# Patient Record
Sex: MALE | Race: WHITE | Employment: UNEMPLOYED | URBAN - METROPOLITAN AREA
[De-identification: names, ages, dates, MRNs, and addresses within clinical notes are randomized per-mention and may not be internally consistent; named-entity substitution may affect disease eponyms.]

---
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
## 2023-01-15 DIAGNOSIS — J45.909 ASTHMA, UNSPECIFIED ASTHMA SEVERITY, UNSPECIFIED WHETHER COMPLICATED, UNSPECIFIED WHETHER PERSISTENT: ICD-10-CM

## 2023-01-16 RX ORDER — MONTELUKAST SODIUM 10 MG/1
10 TABLET ORAL NIGHTLY
Qty: 90 TABLET | Refills: 0 | Status: SHIPPED | OUTPATIENT
Start: 2023-01-16

## 2023-01-16 NOTE — TELEPHONE ENCOUNTER
Future Appointments    This patient does not currently have any appointments scheduled.   Past Visits    Date Provider Specialty Visit Type Primary Dx   11/18/2022 Gilmer Lawler DO Family Medicine Office Visit Acute bacterial sinusitis   07/21/2022 JEANA Westfall - CNP Gastroenterology Office Visit Gastroesophageal reflux disease without esophagitis   06/29/2022 Nancy Ferguson MD Endoscopy Surgery    06/22/2022 Nancy Ferguson MD Gastroenterology Office Visit Nausea   05/31/2022 Noemí Gant MD Family Medicine Office Visit Gastroesophageal reflux disease, unspecified whether esophagitis present

## 2023-04-06 ENCOUNTER — TELEMEDICINE (OUTPATIENT)
Dept: FAMILY MEDICINE CLINIC | Age: 18
End: 2023-04-06

## 2023-04-06 DIAGNOSIS — L90.6 STRETCH MARKS: ICD-10-CM

## 2023-04-06 DIAGNOSIS — F32.A ACUTE DEPRESSION: ICD-10-CM

## 2023-04-06 DIAGNOSIS — L30.9 ECZEMA, UNSPECIFIED TYPE: Primary | ICD-10-CM

## 2023-04-06 PROCEDURE — 99213 OFFICE O/P EST LOW 20 MIN: CPT | Performed by: FAMILY MEDICINE

## 2023-04-06 SDOH — ECONOMIC STABILITY: HOUSING INSECURITY
IN THE LAST 12 MONTHS, WAS THERE A TIME WHEN YOU DID NOT HAVE A STEADY PLACE TO SLEEP OR SLEPT IN A SHELTER (INCLUDING NOW)?: NO

## 2023-04-06 SDOH — ECONOMIC STABILITY: TRANSPORTATION INSECURITY
IN THE PAST 12 MONTHS, HAS LACK OF TRANSPORTATION KEPT YOU FROM MEETINGS, WORK, OR FROM GETTING THINGS NEEDED FOR DAILY LIVING?: YES

## 2023-04-06 SDOH — ECONOMIC STABILITY: INCOME INSECURITY: HOW HARD IS IT FOR YOU TO PAY FOR THE VERY BASICS LIKE FOOD, HOUSING, MEDICAL CARE, AND HEATING?: VERY HARD

## 2023-04-06 SDOH — ECONOMIC STABILITY: FOOD INSECURITY: WITHIN THE PAST 12 MONTHS, YOU WORRIED THAT YOUR FOOD WOULD RUN OUT BEFORE YOU GOT MONEY TO BUY MORE.: SOMETIMES TRUE

## 2023-04-06 SDOH — ECONOMIC STABILITY: FOOD INSECURITY: WITHIN THE PAST 12 MONTHS, THE FOOD YOU BOUGHT JUST DIDN'T LAST AND YOU DIDN'T HAVE MONEY TO GET MORE.: SOMETIMES TRUE

## 2023-04-06 ASSESSMENT — VISUAL ACUITY: OU: 1

## 2023-04-06 ASSESSMENT — PATIENT HEALTH QUESTIONNAIRE - PHQ9
9. THOUGHTS THAT YOU WOULD BE BETTER OFF DEAD, OR OF HURTING YOURSELF: 0
4. FEELING TIRED OR HAVING LITTLE ENERGY: 3
SUM OF ALL RESPONSES TO PHQ QUESTIONS 1-9: 12
1. LITTLE INTEREST OR PLEASURE IN DOING THINGS: 3
SUM OF ALL RESPONSES TO PHQ QUESTIONS 1-9: 12
2. FEELING DOWN, DEPRESSED OR HOPELESS: 1
5. POOR APPETITE OR OVEREATING: 1
7. TROUBLE CONCENTRATING ON THINGS, SUCH AS READING THE NEWSPAPER OR WATCHING TELEVISION: 0
SUM OF ALL RESPONSES TO PHQ QUESTIONS 1-9: 12
SUM OF ALL RESPONSES TO PHQ9 QUESTIONS 1 & 2: 4
3. TROUBLE FALLING OR STAYING ASLEEP: 3
6. FEELING BAD ABOUT YOURSELF - OR THAT YOU ARE A FAILURE OR HAVE LET YOURSELF OR YOUR FAMILY DOWN: 0
8. MOVING OR SPEAKING SO SLOWLY THAT OTHER PEOPLE COULD HAVE NOTICED. OR THE OPPOSITE, BEING SO FIGETY OR RESTLESS THAT YOU HAVE BEEN MOVING AROUND A LOT MORE THAN USUAL: 1
10. IF YOU CHECKED OFF ANY PROBLEMS, HOW DIFFICULT HAVE THESE PROBLEMS MADE IT FOR YOU TO DO YOUR WORK, TAKE CARE OF THINGS AT HOME, OR GET ALONG WITH OTHER PEOPLE: 2
SUM OF ALL RESPONSES TO PHQ QUESTIONS 1-9: 12

## 2023-04-06 NOTE — PATIENT INSTRUCTIONS
No advice available for relieving stretch marks. Patient educated on steroid cream in the use form eczema.     Referral created for psychiatry

## 2023-04-06 NOTE — PROGRESS NOTES
take 1 tablet by mouth nightly 90 tablet 0    clobetasol propionate 0.05 % LOTN lotion APPLY AND RUB IN A THIN FILM TO AFFECTED AREAS TWICE DAILY MORNING AND EVENING 236 mL 5    albuterol sulfate HFA (PROVENTIL;VENTOLIN;PROAIR) 108 (90 Base) MCG/ACT inhaler INHALE 2 TO 3 PUFFS EVERY 4 TO 6 HOURS AS NEEDED FOR COUGH OR WHEEZE OR 30 MINUTES BEFORE SPORTS 17 g 5    omeprazole (PRILOSEC) 20 MG delayed release capsule take 1 capsule by mouth once daily (Patient not taking: Reported on 4/6/2023) 30 capsule 5    sucralfate (CARAFATE) 1 GM tablet Take 1 tablet by mouth 4 times daily (Patient not taking: Reported on 4/6/2023) 120 tablet 0    ondansetron (ZOFRAN ODT) 4 MG disintegrating tablet Take 1 tablet by mouth every 4 hours as needed for Nausea or Vomiting (Patient not taking: Reported on 4/6/2023) 15 tablet 0    EPINEPHrine (EPIPEN) 0.3 MG/0.3ML SOAJ injection Inject into the muscle (Patient not taking: Reported on 4/6/2023)       No current facility-administered medications on file prior to visit.      Past Medical History:   Diagnosis Date    Allergic rhinitis     Asthma     Eczema     GERD (gastroesophageal reflux disease)     Seasonal allergies      Past Surgical History:   Procedure Laterality Date    UPPER GASTROINTESTINAL ENDOSCOPY N/A 6/29/2022    EGD ESOPHAGOGASTRODUODENOSCOPY performed by Aristides Hurd MD at [address] [place] [address] History    Marital status: Single     Spouse name: Not on file    Number of children: Not on file    Years of education: Not on file    Highest education level: Not on file   Occupational History    Not on file   Tobacco Use    Smoking status: Former     Packs/day: 0.25     Years: 3.00     Pack years: 0.75     Types: Cigarettes    Smokeless tobacco: Never    Tobacco comments:     vaping   Vaping Use    Vaping Use: Former    Substances: Nicotine (.5)   Substance and Sexual Activity    Alcohol use: Never    Drug use: Never    Sexual activity: Not on

## 2023-04-27 ENCOUNTER — HOSPITAL ENCOUNTER (EMERGENCY)
Age: 18
Discharge: HOME | End: 2023-04-27
Payer: COMMERCIAL

## 2023-04-27 ENCOUNTER — APPOINTMENT (OUTPATIENT)
Dept: GENERAL RADIOLOGY | Age: 18
End: 2023-04-27
Payer: COMMERCIAL

## 2023-04-27 ENCOUNTER — TELEMEDICINE (OUTPATIENT)
Dept: FAMILY MEDICINE CLINIC | Age: 18
End: 2023-04-27
Payer: COMMERCIAL

## 2023-04-27 VITALS
SYSTOLIC BLOOD PRESSURE: 124 MMHG | WEIGHT: 175 LBS | HEIGHT: 68 IN | HEART RATE: 84 BPM | RESPIRATION RATE: 16 BRPM | OXYGEN SATURATION: 99 % | BODY MASS INDEX: 26.52 KG/M2 | TEMPERATURE: 98.4 F | DIASTOLIC BLOOD PRESSURE: 66 MMHG

## 2023-04-27 DIAGNOSIS — R07.9 CHEST PAIN, UNSPECIFIED TYPE: Primary | ICD-10-CM

## 2023-04-27 DIAGNOSIS — F32.A ACUTE DEPRESSION: ICD-10-CM

## 2023-04-27 DIAGNOSIS — E87.6 HYPOKALEMIA: ICD-10-CM

## 2023-04-27 DIAGNOSIS — F41.1 ANXIETY STATE: Primary | ICD-10-CM

## 2023-04-27 DIAGNOSIS — F41.9 ANXIETY: ICD-10-CM

## 2023-04-27 DIAGNOSIS — R00.2 PALPITATIONS: ICD-10-CM

## 2023-04-27 LAB
ALBUMIN SERPL-MCNC: 3.5 G/DL (ref 3.5–4.6)
ALP SERPL-CCNC: 51 U/L (ref 35–104)
ALT SERPL-CCNC: 12 U/L (ref 0–41)
ANION GAP SERPL CALCULATED.3IONS-SCNC: 14 MEQ/L (ref 9–15)
AST SERPL-CCNC: 11 U/L (ref 0–40)
BASOPHILS # BLD: 0 K/UL (ref 0–0.2)
BASOPHILS NFR BLD: 0.6 %
BILIRUB SERPL-MCNC: 0.6 MG/DL (ref 0.2–0.7)
BUN SERPL-MCNC: 13 MG/DL (ref 6–20)
CALCIUM SERPL-MCNC: 7.1 MG/DL (ref 8.5–9.9)
CHLORIDE SERPL-SCNC: 109 MEQ/L (ref 95–107)
CO2 SERPL-SCNC: 17 MEQ/L (ref 20–31)
CREAT SERPL-MCNC: 0.61 MG/DL (ref 0.7–1.2)
EOSINOPHIL # BLD: 0 K/UL (ref 0–0.7)
EOSINOPHIL NFR BLD: 0.9 %
ERYTHROCYTE [DISTWIDTH] IN BLOOD BY AUTOMATED COUNT: 13.2 % (ref 11.5–14.5)
GLOBULIN SER CALC-MCNC: 1.8 G/DL (ref 2.3–3.5)
GLUCOSE SERPL-MCNC: 65 MG/DL (ref 70–99)
HCT VFR BLD AUTO: 36.2 % (ref 42–52)
HGB BLD-MCNC: 12.1 G/DL (ref 14–18)
LYMPHOCYTES # BLD: 1.1 K/UL (ref 1–4.8)
LYMPHOCYTES NFR BLD: 20.1 %
MCH RBC QN AUTO: 25.7 PG (ref 27–31.3)
MCHC RBC AUTO-ENTMCNC: 33.5 % (ref 33–37)
MCV RBC AUTO: 76.7 FL (ref 79–92.2)
MONOCYTES # BLD: 0.5 K/UL (ref 0.2–0.8)
MONOCYTES NFR BLD: 8.7 %
NEUTROPHILS # BLD: 3.7 K/UL (ref 1.4–6.5)
NEUTS SEG NFR BLD: 69.7 %
PLATELET # BLD AUTO: 205 K/UL (ref 130–400)
POTASSIUM SERPL-SCNC: 2.7 MEQ/L (ref 3.4–4.9)
PROT SERPL-MCNC: 5.3 G/DL (ref 6.3–8)
RBC # BLD AUTO: 4.72 M/UL (ref 4.7–6.1)
SODIUM SERPL-SCNC: 140 MEQ/L (ref 135–144)
TROPONIN T SERPL-MCNC: <0.01 NG/ML (ref 0–0.01)
TSH REFLEX: 1.04 UIU/ML (ref 0.44–3.86)
WBC # BLD AUTO: 5.4 K/UL (ref 4.5–11)

## 2023-04-27 PROCEDURE — 99213 OFFICE O/P EST LOW 20 MIN: CPT | Performed by: FAMILY MEDICINE

## 2023-04-27 PROCEDURE — 6370000000 HC RX 637 (ALT 250 FOR IP)

## 2023-04-27 PROCEDURE — 84443 ASSAY THYROID STIM HORMONE: CPT

## 2023-04-27 PROCEDURE — 71045 X-RAY EXAM CHEST 1 VIEW: CPT

## 2023-04-27 PROCEDURE — 84484 ASSAY OF TROPONIN QUANT: CPT

## 2023-04-27 PROCEDURE — 99285 EMERGENCY DEPT VISIT HI MDM: CPT

## 2023-04-27 PROCEDURE — 80053 COMPREHEN METABOLIC PANEL: CPT

## 2023-04-27 PROCEDURE — 93005 ELECTROCARDIOGRAM TRACING: CPT | Performed by: EMERGENCY MEDICINE

## 2023-04-27 PROCEDURE — 85025 COMPLETE CBC W/AUTO DIFF WBC: CPT

## 2023-04-27 PROCEDURE — 36415 COLL VENOUS BLD VENIPUNCTURE: CPT

## 2023-04-27 RX ORDER — ONDANSETRON 4 MG/1
4 TABLET, ORALLY DISINTEGRATING ORAL ONCE
Status: COMPLETED | OUTPATIENT
Start: 2023-04-27 | End: 2023-04-27

## 2023-04-27 RX ORDER — POTASSIUM CHLORIDE 20 MEQ/1
40 TABLET, EXTENDED RELEASE ORAL ONCE
Status: COMPLETED | OUTPATIENT
Start: 2023-04-27 | End: 2023-04-27

## 2023-04-27 RX ORDER — HYDROXYZINE HYDROCHLORIDE 25 MG/1
25 TABLET, FILM COATED ORAL ONCE
Status: COMPLETED | OUTPATIENT
Start: 2023-04-27 | End: 2023-04-27

## 2023-04-27 RX ADMIN — HYDROXYZINE HYDROCHLORIDE 25 MG: 25 TABLET ORAL at 18:39

## 2023-04-27 RX ADMIN — ONDANSETRON 4 MG: 4 TABLET, ORALLY DISINTEGRATING ORAL at 18:39

## 2023-04-27 RX ADMIN — POTASSIUM CHLORIDE 40 MEQ: 1500 TABLET, EXTENDED RELEASE ORAL at 20:06

## 2023-04-27 ASSESSMENT — ENCOUNTER SYMPTOMS
DIARRHEA: 0
COUGH: 0
ABDOMINAL PAIN: 0
NAUSEA: 0
PHOTOPHOBIA: 0
SHORTNESS OF BREATH: 0
ABDOMINAL DISTENTION: 0
ABDOMINAL PAIN: 0
VOMITING: 0
WHEEZING: 0
COUGH: 0
SHORTNESS OF BREATH: 0
PHOTOPHOBIA: 0
CHEST TIGHTNESS: 0

## 2023-04-27 ASSESSMENT — PAIN - FUNCTIONAL ASSESSMENT: PAIN_FUNCTIONAL_ASSESSMENT: NONE - DENIES PAIN

## 2023-04-27 ASSESSMENT — VISUAL ACUITY: OU: 1

## 2023-04-27 NOTE — ED PROVIDER NOTES
[facility] ED  eMERGENCY dEPARTMENT eNCOUnter      Pt Name: Aggie Rosales  MRN: [id number]  [place] 2005  Date of evaluation: 4/27/2023  Provider: LONNIE Zamora        HISTORY OF PRESENT ILLNESS    Aggie Rosales is a 21 y.o. male per chart review has ah/o GERD, asthma, depression. Patient presents to the emergency department for a anxiety attack. Patient states history of anxiety/panic disorder, he recently started on Zoloft several days ago to try to gain control of this. He started following with a psychiatrist.  Patient states that he anxious, heart racing, sensation of doom, palpitations, bilateral upper and lower extremities paresthesias, diffuse body heaviness, he called EMS. His heart rate was reportedly into the 80s. Patient is much more calm on arrival, normal heart rate. He states he is feeling somewhat better but he still feels a heaviness sensation as well as some mild racing thoughts and paresthesias. REVIEW OF SYSTEMS       Review of Systems   Constitutional:  Negative for chills and fever. HENT:  Negative for congestion. Eyes:  Negative for photophobia. Respiratory:  Negative for cough and shortness of breath. Cardiovascular:  Positive for palpitations. Negative for chest pain. Gastrointestinal:  Negative for abdominal pain, diarrhea, nausea and vomiting. Genitourinary:  Negative for difficulty urinating. Musculoskeletal:  Negative for myalgias. Neurological:  Positive for light-headedness and numbness (b/l UE paresthesias). Negative for headaches. Psychiatric/Behavioral:  Negative for confusion, self-injury, sleep disturbance and suicidal ideas. The patient is nervous/anxious. Except as noted above the remainder of the review of systems was reviewed and negative.        PAST MEDICAL HISTORY     Past Medical History:   Diagnosis Date    Allergic rhinitis     Asthma     Eczema     GERD (gastroesophageal reflux disease)     Seasonal

## 2023-04-27 NOTE — PROGRESS NOTES
2:57 PM    This evaluation was performed through a synchronous (real-time) audio-video encounter. The patient (or guardian if applicable) is aware that this is a billable service, which includes applicable co-pays. This virtual visit was conducted with patient's (and/or legal guardians) consent. The visit was conducted pursuant to the emergency declaration under the [facility], [facility] waiver authority and the [employer] Supplemental Appropriation's Act. The patient identification was verified, and the caregiver was present when appropriate. The patient was located in the state where the provider was licensed to provide care.

## 2023-04-27 NOTE — PATIENT INSTRUCTIONS
Pt will increase sertraline to 50 mg dose and keep f/u with specialist    To ER if chest pain worsens or stays longer than seconds    Continue to work toward smoking cessation

## 2023-04-30 LAB
EKG ATRIAL RATE: 75 BPM
EKG P AXIS: 57 DEGREES
EKG P-R INTERVAL: 138 MS
EKG Q-T INTERVAL: 388 MS
EKG QRS DURATION: 100 MS
EKG QTC CALCULATION (BAZETT): 433 MS
EKG R AXIS: 74 DEGREES
EKG T AXIS: 22 DEGREES
EKG VENTRICULAR RATE: 75 BPM

## 2023-07-21 DIAGNOSIS — J45.909 ASTHMA, UNSPECIFIED ASTHMA SEVERITY, UNSPECIFIED WHETHER COMPLICATED, UNSPECIFIED WHETHER PERSISTENT: ICD-10-CM

## 2023-07-21 NOTE — TELEPHONE ENCOUNTER
Comments: LOV via [employer] 4/27/23 sending [employer] appt reminder for Oct    Last Office Visit (last PCP visit):   Visit date not found    Next Visit Date:  No future appointments. **If hasn't been seen in over a year OR hasn't followed up according to last diabetes/ADHD visit, make appointment for patient before sending refill to provider.     Rx requested:  Requested Prescriptions     Pending Prescriptions Disp Refills    montelukast (SINGULAIR) 10 MG tablet [Pharmacy Med Name: MONTELUKAST SOD 10 MG TABLET] 90 tablet 0     Sig: take 1 tablet by mouth nightly
Yes

## 2023-07-24 RX ORDER — MONTELUKAST SODIUM 10 MG/1
10 TABLET ORAL NIGHTLY
Qty: 90 TABLET | Refills: 0 | Status: SHIPPED | OUTPATIENT
Start: 2023-07-24

## 2023-07-26 DIAGNOSIS — J45.20 MILD INTERMITTENT ASTHMA WITHOUT COMPLICATION: ICD-10-CM

## 2023-07-27 RX ORDER — ALBUTEROL SULFATE 90 UG/1
AEROSOL, METERED RESPIRATORY (INHALATION)
Qty: 8.5 G | Refills: 2 | Status: SHIPPED | OUTPATIENT
Start: 2023-07-27

## 2023-10-20 DIAGNOSIS — J45.909 ASTHMA, UNSPECIFIED ASTHMA SEVERITY, UNSPECIFIED WHETHER COMPLICATED, UNSPECIFIED WHETHER PERSISTENT: ICD-10-CM

## 2023-10-20 RX ORDER — MONTELUKAST SODIUM 10 MG/1
10 TABLET ORAL NIGHTLY
Qty: 90 TABLET | Refills: 0 | Status: SHIPPED | OUTPATIENT
Start: 2023-10-20

## 2023-10-20 NOTE — TELEPHONE ENCOUNTER
DR Vianney Loera out till Tuesday   Future Appointments    This patient does not currently have any appointments scheduled.   Past Visits    Date Provider Specialty Visit Type Primary Dx   04/27/2023 Emma Booker MD Family Medicine Telemedicine Chest pain, unspecified type   04/06/2023 Emma Booker MD Family Medicine Telemedicine Eczema, unspecified type   11/18/2022 Teresita Daley DO Family Medicine Office Visit Acute bacterial sinusitis

## 2023-10-28 DIAGNOSIS — L30.9 ECZEMA, UNSPECIFIED TYPE: ICD-10-CM

## 2023-10-30 RX ORDER — CLOBETASOL PROPIONATE 0.5 MG/ML
LOTION TOPICAL
Qty: 236 ML | Refills: 5 | Status: SHIPPED | OUTPATIENT
Start: 2023-10-30

## 2023-10-30 NOTE — TELEPHONE ENCOUNTER
Future Appointments    This patient does not currently have any appointments scheduled.   Past Visits    Date Provider Specialty Visit Type Primary Dx   04/27/2023 Rodrigo Cancino MD Family Medicine Telemedicine Chest pain, unspecified type   04/06/2023 Rodrigo Cancino MD Family Medicine Telemedicine Eczema, unspecified type   11/18/2022 See Christine DO Family Medicine Office Visit Acute bacterial sinusitis

## 2023-11-15 ENCOUNTER — OFFICE VISIT (OUTPATIENT)
Dept: FAMILY MEDICINE CLINIC | Age: 18
End: 2023-11-15
Payer: COMMERCIAL

## 2023-11-15 VITALS
SYSTOLIC BLOOD PRESSURE: 110 MMHG | HEART RATE: 77 BPM | WEIGHT: 174 LBS | BODY MASS INDEX: 26.37 KG/M2 | OXYGEN SATURATION: 96 % | HEIGHT: 68 IN | DIASTOLIC BLOOD PRESSURE: 70 MMHG

## 2023-11-15 DIAGNOSIS — D64.9 ANEMIA, UNSPECIFIED TYPE: ICD-10-CM

## 2023-11-15 DIAGNOSIS — F32.A ACUTE DEPRESSION: ICD-10-CM

## 2023-11-15 DIAGNOSIS — Z00.00 WELL ADULT EXAM: Primary | ICD-10-CM

## 2023-11-15 DIAGNOSIS — F41.9 ANXIETY: ICD-10-CM

## 2023-11-15 DIAGNOSIS — R79.89 LOW SERUM TOTAL PROTEIN LEVEL: ICD-10-CM

## 2023-11-15 DIAGNOSIS — E87.6 HYPOKALEMIA: ICD-10-CM

## 2023-11-15 PROCEDURE — 99395 PREV VISIT EST AGE 18-39: CPT | Performed by: FAMILY MEDICINE

## 2023-11-15 ASSESSMENT — ENCOUNTER SYMPTOMS
SHORTNESS OF BREATH: 0
VOICE CHANGE: 0
COUGH: 0
CONSTIPATION: 0
NAUSEA: 0
ABDOMINAL DISTENTION: 0
EYE DISCHARGE: 0
DIARRHEA: 0
COLOR CHANGE: 0
TROUBLE SWALLOWING: 0
ABDOMINAL PAIN: 0

## 2023-11-15 NOTE — PROGRESS NOTES
Diagnosis Orders   1. Well adult exam        2. Hypokalemia  Comprehensive Metabolic Panel      3. Low serum total protein level  Comprehensive Metabolic Panel      4. Anemia, unspecified type  CBC with Auto Differential      5. Acute depression  sertraline (ZOLOFT) 50 MG tablet      6. Anxiety  sertraline (ZOLOFT) 50 MG tablet        Return in about 1 year (around 11/15/2024) for for physical exam and eval of preventative need. DAVID Villavicencio is a 19 y.o. male and is here for a comprehensive physical exam.    The patient reportsno problems    Do you have pain that bothers you in your daily life? no    Current Outpatient Medications on File Prior to Visit   Medication Sig Dispense Refill    clobetasol propionate 0.05 % LOTN lotion APPLY AND RUB IN A THIN FILM TO AFFECTED AREAS TWICE DAILY 236 mL 5    montelukast (SINGULAIR) 10 MG tablet take 1 tablet by mouth nightly 90 tablet 0    albuterol sulfate HFA (PROVENTIL;VENTOLIN;PROAIR) 108 (90 Base) MCG/ACT inhaler inhale 2 to 3 puffs by mouth and INTO THE LUNGS every 4 to 6 hours if needed for cough or wheezing or 30 MINUTES before SPORTS 8.5 g 2    EPINEPHrine (EPIPEN) 0.3 MG/0.3ML SOAJ injection Inject into the muscle (Patient not taking: Reported on 11/15/2023)       No current facility-administered medications on file prior to visit.      Past Medical History:   Diagnosis Date    Allergic rhinitis     Asthma     Eczema     GERD (gastroesophageal reflux disease)     Seasonal allergies      Past Surgical History:   Procedure Laterality Date    UPPER GASTROINTESTINAL ENDOSCOPY N/A 6/29/2022    EGD ESOPHAGOGASTRODUODENOSCOPY performed by Kanwal Johnson MD at [address] [place] [address] History    Marital status: Single     Spouse name: Not on file    Number of children: Not on file    Years of education: Not on file    Highest education level: Not on file   Occupational History    Not on file   Tobacco Use    Smoking status: Former

## 2024-01-23 DIAGNOSIS — J45.909 ASTHMA, UNSPECIFIED ASTHMA SEVERITY, UNSPECIFIED WHETHER COMPLICATED, UNSPECIFIED WHETHER PERSISTENT: ICD-10-CM

## 2024-01-24 DIAGNOSIS — J45.20 MILD INTERMITTENT ASTHMA WITHOUT COMPLICATION: ICD-10-CM

## 2024-01-24 RX ORDER — MONTELUKAST SODIUM 10 MG/1
10 TABLET ORAL NIGHTLY
Qty: 90 TABLET | Refills: 3 | Status: SHIPPED | OUTPATIENT
Start: 2024-01-24

## 2024-01-24 NOTE — TELEPHONE ENCOUNTER
Comments:     Last Office Visit (last PCP visit):   11/15/2023        Next Visit Date:  Future Appointments   Date Time Provider Department Center   11/18/2024 10:00 AM Royal Pino MD [facility]       **If hasn't been seen in over a year OR hasn't followed up according to last diabetes/ADHD visit, make appointment for patient before sending refill to provider.    Rx requested:  Requested Prescriptions     Pending Prescriptions Disp Refills    montelukast (SINGULAIR) 10 MG tablet [Pharmacy Med Name: MONTELUKAST SOD 10 MG TABLET] 90 tablet 0     Sig: take 1 tablet by mouth nightly

## 2024-01-25 RX ORDER — ALBUTEROL SULFATE 90 UG/1
AEROSOL, METERED RESPIRATORY (INHALATION)
Qty: 8.5 G | Refills: 2 | Status: SHIPPED | OUTPATIENT
Start: 2024-01-25

## 2024-01-25 NOTE — TELEPHONE ENCOUNTER
Future Appointments    Encounter Information   Provider Department Appt Notes   11/18/2024 Royal Pino MD [facility] Return in about 1 year (around 11/15/2024) for for physical exam and eval of preventative need.     Past Visits    Date Provider Specialty Visit Type Primary Dx   11/15/2023 Royal Pino MD Family Medicine Office Visit Well adult exam   04/27/2023 Royal Pino MD Family Medicine Telemedicine Chest pain, unspecified type   04/06/2023 Royal Pino MD Family Medicine Telemedicine Eczema, unspecified type

## 2024-05-31 DIAGNOSIS — J45.20 MILD INTERMITTENT ASTHMA WITHOUT COMPLICATION: ICD-10-CM

## 2024-05-31 NOTE — TELEPHONE ENCOUNTER
Future Appointments    Encounter Information   Provider Department Appt Notes   11/18/2024 Royal Pino MD [facility] Primary Care Return in about 1 year (around 11/15/2024) for for physical exam and eval of preventative need.     Past Visits    Date Provider Specialty Visit Type Primary Dx   11/15/2023 Royal Pino MD Family Medicine Office Visit Well adult exam   04/27/2023 Royal Pino MD Family Medicine Telemedicine Chest pain, unspecified type

## 2024-06-03 RX ORDER — ALBUTEROL SULFATE 90 UG/1
AEROSOL, METERED RESPIRATORY (INHALATION)
Qty: 8.5 G | Refills: 2 | Status: SHIPPED | OUTPATIENT
Start: 2024-06-03

## 2024-08-12 DIAGNOSIS — L30.9 ECZEMA, UNSPECIFIED TYPE: ICD-10-CM

## 2024-08-12 DIAGNOSIS — J45.20 MILD INTERMITTENT ASTHMA WITHOUT COMPLICATION: ICD-10-CM

## 2024-08-12 NOTE — TELEPHONE ENCOUNTER
Comments:     Last Office Visit (last PCP visit):   11/15/2023    Next Visit Date:  Future Appointments   Date Time Provider Department Center   11/18/2024 10:00 AM Royal Pino MD [facility]       **If hasn't been seen in over a year OR hasn't followed up according to last diabetes/ADHD visit, make appointment for patient before sending refill to provider.    Rx requested:  Requested Prescriptions      No prescriptions requested or ordered in this encounter

## 2024-08-13 RX ORDER — CLOBETASOL PROPIONATE 0.5 MG/ML
LOTION TOPICAL
Qty: 236 ML | Refills: 5 | Status: SHIPPED | OUTPATIENT
Start: 2024-08-13

## 2024-08-13 RX ORDER — ALBUTEROL SULFATE 90 UG/1
2 AEROSOL, METERED RESPIRATORY (INHALATION)
Qty: 8.5 G | Refills: 2 | Status: SHIPPED | OUTPATIENT
Start: 2024-08-13

## 2025-01-23 DIAGNOSIS — J45.909 ASTHMA, UNSPECIFIED ASTHMA SEVERITY, UNSPECIFIED WHETHER COMPLICATED, UNSPECIFIED WHETHER PERSISTENT: ICD-10-CM

## 2025-01-24 RX ORDER — MONTELUKAST SODIUM 10 MG/1
10 TABLET ORAL NIGHTLY
Qty: 30 TABLET | Refills: 0 | Status: SHIPPED | OUTPATIENT
Start: 2025-01-24 | End: 2025-02-23

## 2025-01-24 NOTE — TELEPHONE ENCOUNTER
Comments: LOV 11-15-23 [employer] message sent regarding appt    Last Office Visit (last PCP visit):   Visit date not found    Next Visit Date:  No future appointments.    **If hasn't been seen in over a year OR hasn't followed up according to last diabetes/ADHD visit, make appointment for patient before sending refill to provider.    Rx requested:  Requested Prescriptions     Pending Prescriptions Disp Refills    montelukast (SINGULAIR) 10 MG tablet 90 tablet 3     Sig: Take 1 tablet by mouth nightly

## 2025-01-24 NOTE — TELEPHONE ENCOUNTER
Comments:     Last Office Visit (last PCP visit):   Visit date not found    Next Visit Date:  No future appointments.    **If hasn't been seen in over a year OR hasn't followed up according to last diabetes/ADHD visit, make appointment for patient before sending refill to provider.    Rx requested:  Requested Prescriptions     Pending Prescriptions Disp Refills    montelukast (SINGULAIR) 10 MG tablet 90 tablet 3     Sig: Take 1 tablet by mouth nightly

## 2025-03-04 DIAGNOSIS — J45.909 ASTHMA, UNSPECIFIED ASTHMA SEVERITY, UNSPECIFIED WHETHER COMPLICATED, UNSPECIFIED WHETHER PERSISTENT: ICD-10-CM

## 2025-03-05 RX ORDER — MONTELUKAST SODIUM 10 MG/1
10 TABLET ORAL NIGHTLY
Qty: 30 TABLET | Refills: 0 | OUTPATIENT
Start: 2025-03-05

## 2025-03-05 NOTE — TELEPHONE ENCOUNTER
Comments:     Last Office Visit (last PCP visit):   11/15/2023    Next Visit Date:  No future appointments.    **If hasn't been seen in over a year OR hasn't followed up according to last diabetes/ADHD visit, make appointment for patient before sending refill to provider.    Rx requested:  Requested Prescriptions     Pending Prescriptions Disp Refills    montelukast (SINGULAIR) 10 MG tablet [Pharmacy Med Name: Montelukast Sodium Oral Tablet 10 MG] 30 tablet 0     Sig: TAKE ONE TABLET BY MOUTH nightly

## 2025-04-17 ENCOUNTER — OFFICE VISIT (OUTPATIENT)
Age: 20
End: 2025-04-17
Payer: COMMERCIAL

## 2025-04-17 ENCOUNTER — TELEPHONE (OUTPATIENT)
Age: 20
End: 2025-04-17

## 2025-04-17 VITALS
OXYGEN SATURATION: 98 % | BODY MASS INDEX: 33.34 KG/M2 | WEIGHT: 220 LBS | DIASTOLIC BLOOD PRESSURE: 68 MMHG | HEART RATE: 76 BPM | HEIGHT: 68 IN | SYSTOLIC BLOOD PRESSURE: 130 MMHG | TEMPERATURE: 97.7 F

## 2025-04-17 DIAGNOSIS — Z91.010 PEANUT ALLERGY: ICD-10-CM

## 2025-04-17 DIAGNOSIS — L90.6 STRIAE: ICD-10-CM

## 2025-04-17 DIAGNOSIS — L30.9 ECZEMA, UNSPECIFIED TYPE: ICD-10-CM

## 2025-04-17 DIAGNOSIS — J45.909 ASTHMA, UNSPECIFIED ASTHMA SEVERITY, UNSPECIFIED WHETHER COMPLICATED, UNSPECIFIED WHETHER PERSISTENT: Primary | ICD-10-CM

## 2025-04-17 DIAGNOSIS — F32.A ACUTE DEPRESSION: ICD-10-CM

## 2025-04-17 DIAGNOSIS — J45.20 MILD INTERMITTENT ASTHMA WITHOUT COMPLICATION: ICD-10-CM

## 2025-04-17 DIAGNOSIS — F41.9 ANXIETY: ICD-10-CM

## 2025-04-17 PROCEDURE — 99214 OFFICE O/P EST MOD 30 MIN: CPT | Performed by: NURSE PRACTITIONER

## 2025-04-17 RX ORDER — SERTRALINE HYDROCHLORIDE 100 MG/1
150 TABLET, FILM COATED ORAL DAILY
Qty: 45 TABLET | Refills: 3
Start: 2025-04-17

## 2025-04-17 RX ORDER — CRISABOROLE 20 MG/G
60 OINTMENT TOPICAL DAILY PRN
Qty: 60 G | Refills: 1 | Status: SHIPPED | OUTPATIENT
Start: 2025-04-17 | End: 2025-04-17 | Stop reason: SDUPTHER

## 2025-04-17 RX ORDER — CRISABOROLE 20 MG/G
1 OINTMENT TOPICAL DAILY PRN
Qty: 60 G | Refills: 1 | Status: SHIPPED | OUTPATIENT
Start: 2025-04-17

## 2025-04-17 RX ORDER — ALBUTEROL SULFATE 90 UG/1
2 INHALANT RESPIRATORY (INHALATION)
Qty: 8.5 G | Refills: 2 | Status: SHIPPED | OUTPATIENT
Start: 2025-04-17

## 2025-04-17 RX ORDER — MONTELUKAST SODIUM 10 MG/1
10 TABLET ORAL NIGHTLY
Qty: 90 TABLET | Refills: 2 | Status: SHIPPED | OUTPATIENT
Start: 2025-04-17 | End: 2025-05-17

## 2025-04-17 RX ORDER — EPINEPHRINE 0.3 MG/.3ML
0.3 INJECTION SUBCUTANEOUS ONCE
Qty: 1 EACH | Refills: 5 | Status: SHIPPED | OUTPATIENT
Start: 2025-04-17 | End: 2025-04-17

## 2025-04-17 SDOH — ECONOMIC STABILITY: FOOD INSECURITY: WITHIN THE PAST 12 MONTHS, THE FOOD YOU BOUGHT JUST DIDN'T LAST AND YOU DIDN'T HAVE MONEY TO GET MORE.: SOMETIMES TRUE

## 2025-04-17 SDOH — ECONOMIC STABILITY: FOOD INSECURITY: WITHIN THE PAST 12 MONTHS, YOU WORRIED THAT YOUR FOOD WOULD RUN OUT BEFORE YOU GOT MONEY TO BUY MORE.: SOMETIMES TRUE

## 2025-04-17 ASSESSMENT — PATIENT HEALTH QUESTIONNAIRE - PHQ9
7. TROUBLE CONCENTRATING ON THINGS, SUCH AS READING THE NEWSPAPER OR WATCHING TELEVISION: NOT AT ALL
1. LITTLE INTEREST OR PLEASURE IN DOING THINGS: NOT AT ALL
4. FEELING TIRED OR HAVING LITTLE ENERGY: NEARLY EVERY DAY
SUM OF ALL RESPONSES TO PHQ QUESTIONS 1-9: 7
SUM OF ALL RESPONSES TO PHQ QUESTIONS 1-9: 7
9. THOUGHTS THAT YOU WOULD BE BETTER OFF DEAD, OR OF HURTING YOURSELF: NOT AT ALL
2. FEELING DOWN, DEPRESSED OR HOPELESS: NEARLY EVERY DAY
6. FEELING BAD ABOUT YOURSELF - OR THAT YOU ARE A FAILURE OR HAVE LET YOURSELF OR YOUR FAMILY DOWN: NOT AT ALL
SUM OF ALL RESPONSES TO PHQ QUESTIONS 1-9: 7
5. POOR APPETITE OR OVEREATING: SEVERAL DAYS
3. TROUBLE FALLING OR STAYING ASLEEP: NOT AT ALL
8. MOVING OR SPEAKING SO SLOWLY THAT OTHER PEOPLE COULD HAVE NOTICED. OR THE OPPOSITE, BEING SO FIGETY OR RESTLESS THAT YOU HAVE BEEN MOVING AROUND A LOT MORE THAN USUAL: NOT AT ALL
SUM OF ALL RESPONSES TO PHQ QUESTIONS 1-9: 7
10. IF YOU CHECKED OFF ANY PROBLEMS, HOW DIFFICULT HAVE THESE PROBLEMS MADE IT FOR YOU TO DO YOUR WORK, TAKE CARE OF THINGS AT HOME, OR GET ALONG WITH OTHER PEOPLE: NOT DIFFICULT AT ALL

## 2025-04-17 NOTE — PROGRESS NOTES
[facility]  [facility]  [address] [address]  Dept: [phone]  Dept Fax: [phone]  Loc: [phone]     Subjective  Sekou Matamoros, 19 y.o. male Established patient presents today with:  Chief Complaint   Patient presents with    Establish Care    Back Pain    Skin Problem     Eczema Tx    Nutrition Counseling     Wants some information on what he should be eating.       History of Present Illness  The patient presents for evaluation of anxiety and depression, asthma, and eczema.    Currently, he is on a regimen of Zoloft 150 mg for the management of anxiety and depression, which is reported to be effective. An appointment with a psychotherapist is scheduled for 04/23/2025 to further evaluate his mental health status. No refill of the medication is required at this time.    An EpiPen is utilized as a precautionary measure against potential peanut allergy reactions. Refills for Singulair and albuterol prescriptions are needed. Asthma has been stable, except for a lack of Singulair refills over the past 3 months. Despite this, symptoms have been managed effectively. Singulair is reported to be highly effective in controlling asthma, to the extent that the inhaler is rarely needed.    A long-standing history of eczema is managed with clobetasol, a medicated lotion, used since approximately 10 to 12 years old. The presence of purple and red scars on the skin is observed, believed to be indicative of active scars. These scars are present on both arms, with an increase in number since weight increased from 170 to 220 pounds. Scars are also reported between the thighs, where the skin is notably thin. Clobetasol has been applied to all areas of active eczema, as per previous doctor's instructions, but recently stopped applying it to the area between the thighs. Alternative treatments for eczema,

## 2025-04-17 NOTE — TELEPHONE ENCOUNTER
[facility] calling in to clarify EUCRISA      Apply 60 g topically daily as needed (apply topically as needed), Disp-60 g, R-1     60 g is the entire tube    Please clarify  Phone [phone]

## 2025-05-14 ENCOUNTER — OFFICE VISIT (OUTPATIENT)
Age: 20
End: 2025-05-14
Payer: COMMERCIAL

## 2025-05-14 VITALS
HEART RATE: 82 BPM | BODY MASS INDEX: 33.49 KG/M2 | HEIGHT: 68 IN | OXYGEN SATURATION: 97 % | WEIGHT: 221 LBS | SYSTOLIC BLOOD PRESSURE: 122 MMHG | DIASTOLIC BLOOD PRESSURE: 84 MMHG

## 2025-05-14 DIAGNOSIS — L90.6 STRIAE: Primary | ICD-10-CM

## 2025-05-14 DIAGNOSIS — R53.83 OTHER FATIGUE: ICD-10-CM

## 2025-05-14 PROCEDURE — 99243 OFF/OP CNSLTJ NEW/EST LOW 30: CPT | Performed by: PHYSICIAN ASSISTANT

## 2025-05-14 RX ORDER — DEXAMETHASONE 1 MG
TABLET ORAL
Qty: 1 TABLET | Refills: 0 | Status: SHIPPED | OUTPATIENT
Start: 2025-05-14

## 2025-05-14 RX ORDER — BUPROPION HYDROCHLORIDE 150 MG/1
150 TABLET ORAL EVERY MORNING
COMMUNITY
Start: 2025-04-23

## 2025-05-14 ASSESSMENT — ENCOUNTER SYMPTOMS
WHEEZING: 0
DIARRHEA: 0
ABDOMINAL PAIN: 0
VOMITING: 0
NAUSEA: 0
SINUS PRESSURE: 0
SHORTNESS OF BREATH: 0
SORE THROAT: 0
COUGH: 0
RHINORRHEA: 0

## 2025-05-14 NOTE — PROGRESS NOTES
5/14/2025    Diagnosis       Diagnosis Orders   1. Striae  Cortisol Total    dexAMETHasone (DECADRON) 1 MG tablet    ACTH    Insulin Free & Total    C-Peptide    Comprehensive Metabolic Panel    Testosterone, free, total    CBC    Lipid Panel      2. Other fatigue  Testosterone, free, total    CBC    Lipid Panel               Get 8:00 AM labs drawn in the next 1-2 weeks  Prescription for dexamethasone 1 mg sent to your pharmacy today  Take 1 mg of dexamethasone the night before your second morning lab draw in the next 2-3 weeks    Assessment & Plan  1. Striae distensae  - Age not typical for Cushing's syndrome, which affects older women with diabetes  - Normal TSH levels, no history of high glucose, hypertension, or hypercholesterolemia; normal blood counts  - Comprehensive hormone panel (ACTH, morning cortisol) to investigate underlying causes; [facility] 8-9 AM [facility]  - Prescribed dexamethasone 1 mg, to be taken at 11 PM before second lab draw; maintain hydration post-blood work  - Fasting labs for cholesterol  - If cortisol <1.8, hypercortisolism unlikely; elevated cortisol may warrant adrenal CT scan    2. Depression and anxiety  - Diagnosed with depression and anxiety, potentially influenced by cortisol levels  - High cortisol can exacerbate mental health symptoms  - Continue monitoring mental health and discuss concerns with provider  - Recommended exercise program, including aerobics, to manage symptoms and improve well-being    Follow-up in 6 weeks     Orders Placed This Encounter   Procedures    Cortisol Total     Standing Status:   Standing     Number of Occurrences:   3     Next Expected Occurrence:   5/16/2025     Expiration Date:   5/14/2026     8AM or 4PM?:   8              am    ACTH     Standing Status:   Standing     Number of Occurrences:   3     Next Expected Occurrence:   5/16/2025     Expiration Date:   5/14/2026    Insulin Free & Total     Standing Status:   Future     Expected Date:

## 2025-05-22 DIAGNOSIS — R53.83 OTHER FATIGUE: ICD-10-CM

## 2025-05-22 DIAGNOSIS — L90.6 STRIAE: ICD-10-CM

## 2025-05-22 LAB
ALBUMIN SERPL-MCNC: 4.4 G/DL (ref 3.5–4.6)
ALP SERPL-CCNC: 90 U/L (ref 35–104)
ALT SERPL-CCNC: 6 U/L (ref 0–41)
ANION GAP SERPL CALCULATED.3IONS-SCNC: 11 MEQ/L (ref 9–15)
AST SERPL-CCNC: 16 U/L (ref 0–40)
BILIRUB SERPL-MCNC: 0.3 MG/DL (ref 0.2–0.7)
BUN SERPL-MCNC: 15 MG/DL (ref 6–20)
CALCIUM SERPL-MCNC: 8.9 MG/DL (ref 8.5–9.9)
CHLORIDE SERPL-SCNC: 104 MEQ/L (ref 95–107)
CHOLEST SERPL-MCNC: 235 MG/DL (ref 0–199)
CO2 SERPL-SCNC: 23 MEQ/L (ref 20–31)
CREAT SERPL-MCNC: 0.79 MG/DL (ref 0.7–1.2)
ERYTHROCYTE [DISTWIDTH] IN BLOOD BY AUTOMATED COUNT: 13 % (ref 11.5–14.5)
GLOBULIN SER CALC-MCNC: 2.8 G/DL (ref 2.3–3.5)
GLUCOSE SERPL-MCNC: 92 MG/DL (ref 70–99)
HCT VFR BLD AUTO: 45.9 % (ref 42–52)
HDLC SERPL-MCNC: 40 MG/DL (ref 40–59)
HGB BLD-MCNC: 14.9 G/DL (ref 14–18)
LDLC SERPL CALC-MCNC: 168 MG/DL (ref 0–129)
MCH RBC QN AUTO: 24.9 PG (ref 27–31.3)
MCHC RBC AUTO-ENTMCNC: 32.5 % (ref 33–37)
MCV RBC AUTO: 76.8 FL (ref 79–92.2)
PLATELET # BLD AUTO: 361 K/UL (ref 130–400)
POTASSIUM SERPL-SCNC: 4.1 MEQ/L (ref 3.4–4.9)
PROT SERPL-MCNC: 7.2 G/DL (ref 6.3–8)
RBC # BLD AUTO: 5.98 M/UL (ref 4.7–6.1)
SODIUM SERPL-SCNC: 138 MEQ/L (ref 135–144)
TRIGL SERPL-MCNC: 133 MG/DL (ref 0–150)
WBC # BLD AUTO: 7.3 K/UL (ref 4.8–10.8)

## 2025-05-23 LAB
ACTH PLAS-MCNC: 5 PG/ML (ref 7.2–63.3)
C PEPTIDE SERPL-MCNC: 2.3 NG/ML (ref 1.1–4.4)
CORTISOL COLLECTION INFO: NORMAL
CORTISOL: 2.9 UG/DL (ref 2.5–19.5)
SHBG SERPL-SCNC: 23 NMOL/L (ref 17–56)
TESTOST FREE SERPL-MCNC: 119.4 PG/ML (ref 47–244)
TESTOST SERPL-MCNC: 474 NG/DL (ref 249–836)

## 2025-05-29 LAB
INSULIN FREE SERPL-ACNC: 10 UIU/ML (ref 3–25)
INSULIN SERPL-ACNC: 13 UIU/ML (ref 3–25)

## 2025-06-30 ENCOUNTER — PATIENT MESSAGE (OUTPATIENT)
Age: 20
End: 2025-06-30

## 2025-06-30 DIAGNOSIS — F41.9 ANXIETY: ICD-10-CM

## 2025-06-30 DIAGNOSIS — F32.A ACUTE DEPRESSION: ICD-10-CM

## 2025-07-01 RX ORDER — SERTRALINE HYDROCHLORIDE 100 MG/1
150 TABLET, FILM COATED ORAL DAILY
Qty: 45 TABLET | Refills: 0 | Status: SHIPPED | OUTPATIENT
Start: 2025-07-01

## 2025-07-21 DIAGNOSIS — L90.6 STRIAE: Primary | ICD-10-CM

## 2025-07-23 ENCOUNTER — OFFICE VISIT (OUTPATIENT)
Age: 20
End: 2025-07-23
Payer: COMMERCIAL

## 2025-07-23 VITALS
WEIGHT: 234 LBS | BODY MASS INDEX: 35.46 KG/M2 | OXYGEN SATURATION: 97 % | HEIGHT: 68 IN | TEMPERATURE: 97.9 F | DIASTOLIC BLOOD PRESSURE: 68 MMHG | SYSTOLIC BLOOD PRESSURE: 120 MMHG | HEART RATE: 99 BPM

## 2025-07-23 DIAGNOSIS — F32.A MILD DEPRESSION: Primary | ICD-10-CM

## 2025-07-23 DIAGNOSIS — F41.1 GENERALIZED ANXIETY DISORDER: ICD-10-CM

## 2025-07-23 DIAGNOSIS — L30.9 ECZEMA, UNSPECIFIED TYPE: ICD-10-CM

## 2025-07-23 DIAGNOSIS — L90.6 STRIAE: ICD-10-CM

## 2025-07-23 DIAGNOSIS — F32.A MILD DEPRESSION: ICD-10-CM

## 2025-07-23 PROCEDURE — 99214 OFFICE O/P EST MOD 30 MIN: CPT | Performed by: NURSE PRACTITIONER

## 2025-07-23 RX ORDER — BUPROPION HYDROCHLORIDE 150 MG/1
TABLET ORAL
Qty: 30 TABLET | Refills: 1 | Status: SHIPPED | OUTPATIENT
Start: 2025-07-23 | End: 2025-07-24

## 2025-07-23 NOTE — PROGRESS NOTES
[facility] Primary [facility]  [facility]  [address] [address]  Dept: [phone]  Dept Fax: [phone]  Loc: [phone]     Subjective  Sekou Matamoros, 19 y.o. male Established patient presents today with:  Chief Complaint   Patient presents with    Follow-up     Pt has no concerns        History of Present Illness  The patient presents for evaluation of mood swings and sleep issues.    He has been off Zoloft for approximately 1.5 months, which has resulted in sleep disturbances. He finds it difficult to fall asleep before 3 AM and struggles to wake up before noon. This has interfered with his ability to complete the required morning blood tests. His mood has been unstable without Zoloft, with some days being manageable while others are marked by uncontrollable anger and rage. He identifies a combination of depression, anxiety, and mood swings as his primary issues. He is not currently engaged in counseling due to time constraints. He was previously on Wellbutrin but discontinued it after two weeks due to adverse effects. He was also taking Zoloft 50 mg at that time.    He reports that Eucrisa cream causes a burning sensation when applied to his face, but he has not noticed any other symptoms. He has been dealing with stretch marks that have worsened over time, causing him significant distress. These marks were not present on his arm three to four years ago but have since spread across his body. He is eager to understand the cause of these changes and is particularly concerned about the potential impact on his plans to get tattoos.    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

## 2025-07-24 RX ORDER — BUPROPION HYDROCHLORIDE 150 MG/1
TABLET ORAL
Qty: 57 TABLET | Refills: 1 | Status: SHIPPED | OUTPATIENT
Start: 2025-07-24

## 2025-08-20 ENCOUNTER — OFFICE VISIT (OUTPATIENT)
Age: 20
End: 2025-08-20
Payer: COMMERCIAL

## 2025-08-20 VITALS
OXYGEN SATURATION: 98 % | HEIGHT: 68 IN | WEIGHT: 235 LBS | SYSTOLIC BLOOD PRESSURE: 130 MMHG | HEART RATE: 75 BPM | TEMPERATURE: 97.8 F | DIASTOLIC BLOOD PRESSURE: 70 MMHG | BODY MASS INDEX: 35.61 KG/M2

## 2025-08-20 DIAGNOSIS — F32.A ACUTE DEPRESSION: ICD-10-CM

## 2025-08-20 DIAGNOSIS — F41.9 ANXIETY: Primary | ICD-10-CM

## 2025-08-20 PROCEDURE — 99214 OFFICE O/P EST MOD 30 MIN: CPT | Performed by: NURSE PRACTITIONER

## 2025-08-26 DIAGNOSIS — F32.A ACUTE DEPRESSION: ICD-10-CM

## 2025-08-26 DIAGNOSIS — F32.A MILD DEPRESSION: Primary | ICD-10-CM

## 2025-08-26 DIAGNOSIS — F41.9 ANXIETY: ICD-10-CM

## (undated) DEVICE — ENDO CARRY-ON PROCEDURE KIT: Brand: ENDO CARRY-ON PROCEDURE KIT

## (undated) DEVICE — BRUSH ENDO CLN L90.5IN SHTH DIA1.7MM BRIST DIA5-7MM 2-6MM

## (undated) DEVICE — Device: Brand: ENDO SMARTCAP

## (undated) DEVICE — SINGLE PORT MANIFOLD: Brand: NEPTUNE 2

## (undated) DEVICE — FORCEPS BX L240CM JAW DIA2.8MM L CAP W/ NDL MIC MESH TOOTH

## (undated) DEVICE — TUBE SET 96 MM 64 MM H2O PERISTALTIC STD AUX CHANNEL

## (undated) DEVICE — CONMED SCOPE SAVER BITE BLOCK, 20X27 MM: Brand: SCOPE SAVER

## (undated) DEVICE — TUBING, SUCTION, 1/4" X 10', STRAIGHT: Brand: MEDLINE